# Patient Record
(demographics unavailable — no encounter records)

---

## 2025-03-12 NOTE — PHYSICAL EXAM
[General Appearance - Well Developed] : well developed [General Appearance - Well Nourished] : well nourished [] : no respiratory distress [Normal Station and Gait] : the gait and station were normal for the patient's age [Skin Color & Pigmentation] : normal skin color and pigmentation [Affect] : the affect was normal [Mood] : the mood was normal

## 2025-03-12 NOTE — ASSESSMENT
[FreeTextEntry1] : GSM - estradiol cream renewed counseled pt on importance of consistent use for the protection against uti and dysuria   NATANAEL  counseled re pfpt, she would rather defer not too bothersome  overall doing well, prurities has improved  RTO 1 year

## 2025-03-12 NOTE — HISTORY OF PRESENT ILLNESS
[FreeTextEntry1] : 73 yr old F with hx of dysuria and GSM here today for 6 month follow up.   GSM - estradiol cream - stopped using about 1 month ago  No dysuira No UTI in last 6 months NATANAEL - rare  Nocturia 1-2   Pruritis has improved  Bowel movements - daily, rare constipation

## 2025-05-22 NOTE — HISTORY OF PRESENT ILLNESS
[de-identified] : PRASHANTH MELTON  is a 73 year old woman who presents to the [facility] with a chief complaint of throat pain.   She is referred by ~  ~.   They have had pain in their throat for ~  ~ months. They ~  ~ have a prior CT neck. They ~  ~ have prior smoking history. They ~  ~ have prior history of alcoholism/alcohol abuse. Throat pain is ~  ~ when swallowing solids or liquids. They ~  ~ have weight loss in the past 3 months. They ~  ~ have to alter their diet because of this pain. They ~  ~ have frequent ear pain. Pain is ~  ~ when chewing. They ~  ~ have a prior swallow study in the past 1 year. They have seen the following types of providers for this problem: ~  ~. They have taken the following medications for this problem: ~  ~ Doing or taking the following makes the pain better: ~  ~ Doing the following makes the pain worse: ~  ~

## 2025-05-22 NOTE — HISTORY OF PRESENT ILLNESS
[de-identified] : PRASHANTH MELTON  is a 73 year old woman who presents to the [facility] with a chief complaint of throat pain.   She is referred by ~  ~.   They have had pain in their throat for ~  ~ months. They ~  ~ have a prior CT neck. They ~  ~ have prior smoking history. They ~  ~ have prior history of alcoholism/alcohol abuse. Throat pain is ~  ~ when swallowing solids or liquids. They ~  ~ have weight loss in the past 3 months. They ~  ~ have to alter their diet because of this pain. They ~  ~ have frequent ear pain. Pain is ~  ~ when chewing. They ~  ~ have a prior swallow study in the past 1 year. They have seen the following types of providers for this problem: ~  ~. They have taken the following medications for this problem: ~  ~ Doing or taking the following makes the pain better: ~  ~ Doing the following makes the pain worse: ~  ~